# Patient Record
Sex: MALE | Race: AMERICAN INDIAN OR ALASKA NATIVE
[De-identification: names, ages, dates, MRNs, and addresses within clinical notes are randomized per-mention and may not be internally consistent; named-entity substitution may affect disease eponyms.]

---

## 2018-11-01 ENCOUNTER — HOSPITAL ENCOUNTER (OUTPATIENT)
Dept: HOSPITAL 42 - ED | Age: 83
Setting detail: OBSERVATION
LOS: 2 days | Discharge: HOME | End: 2018-11-03
Attending: INTERNAL MEDICINE | Admitting: INTERNAL MEDICINE
Payer: MEDICARE

## 2018-11-01 VITALS — BODY MASS INDEX: 25.8 KG/M2

## 2018-11-01 DIAGNOSIS — E78.00: ICD-10-CM

## 2018-11-01 DIAGNOSIS — N32.9: ICD-10-CM

## 2018-11-01 DIAGNOSIS — I08.3: ICD-10-CM

## 2018-11-01 DIAGNOSIS — R31.9: ICD-10-CM

## 2018-11-01 DIAGNOSIS — Z87.891: ICD-10-CM

## 2018-11-01 DIAGNOSIS — R07.9: Primary | ICD-10-CM

## 2018-11-01 DIAGNOSIS — R10.13: ICD-10-CM

## 2018-11-01 DIAGNOSIS — M19.90: ICD-10-CM

## 2018-11-01 LAB
ALBUMIN SERPL-MCNC: 3.8 G/DL (ref 3–4.8)
ALBUMIN/GLOB SERPL: 1.3 {RATIO} (ref 1.1–1.8)
ALT SERPL-CCNC: 21 U/L (ref 7–56)
APPEARANCE UR: CLEAR
AST SERPL-CCNC: 20 U/L (ref 17–59)
BASOPHILS # BLD AUTO: 0.02 K/MM3 (ref 0–2)
BASOPHILS NFR BLD: 0.2 % (ref 0–3)
BILIRUB UR-MCNC: NEGATIVE MG/DL
BNP SERPL-MCNC: 134 PG/ML (ref 0–450)
BUN SERPL-MCNC: 18 MG/DL (ref 7–21)
CALCIUM SERPL-MCNC: 9.3 MG/DL (ref 8.4–10.5)
COLOR UR: YELLOW
EOSINOPHIL # BLD: 0 10*3/UL (ref 0–0.7)
EOSINOPHIL NFR BLD: 0.4 % (ref 1.5–5)
ERYTHROCYTE [DISTWIDTH] IN BLOOD BY AUTOMATED COUNT: 15.8 % (ref 11.5–14.5)
GFR NON-AFRICAN AMERICAN: > 60
GLUCOSE UR STRIP-MCNC: NEGATIVE MG/DL
GRANULOCYTES # BLD: 5.93 10*3/UL (ref 1.4–6.5)
GRANULOCYTES NFR BLD: 60.2 % (ref 50–68)
HGB BLD-MCNC: 13.2 G/DL (ref 14–18)
LEUKOCYTE ESTERASE UR-ACNC: NEGATIVE LEU/UL
LYMPHOCYTES # BLD: 3.3 10*3/UL (ref 1.2–3.4)
LYMPHOCYTES NFR BLD AUTO: 33.5 % (ref 22–35)
MCH RBC QN AUTO: 28.1 PG (ref 25–35)
MCHC RBC AUTO-ENTMCNC: 32.9 G/DL (ref 31–37)
MCV RBC AUTO: 85.3 FL (ref 80–105)
MONOCYTES # BLD AUTO: 0.6 10*3/UL (ref 0.1–0.6)
MONOCYTES NFR BLD: 5.7 % (ref 1–6)
PH UR STRIP: 6.5 [PH] (ref 4.7–8)
PLATELET # BLD: 244 10^3/UL (ref 120–450)
PMV BLD AUTO: 11.2 FL (ref 7–11)
PROT UR STRIP-MCNC: 30 MG/DL
RBC # BLD AUTO: 4.7 10^6/UL (ref 3.5–6.1)
RBC # UR STRIP: (no result) /UL
RBC #/AREA URNS HPF: (no result) /HPF (ref 0–2)
SP GR UR STRIP: 1.01 (ref 1–1.03)
TROPONIN I SERPL-MCNC: < 0.01 NG/ML
TROPONIN I SERPL-MCNC: < 0.01 NG/ML
UROBILINOGEN UR STRIP-ACNC: 0.2 E.U./DL
WBC # BLD AUTO: 9.9 10^3/UL (ref 4.5–11)

## 2018-11-01 PROCEDURE — 71275 CT ANGIOGRAPHY CHEST: CPT

## 2018-11-01 PROCEDURE — 80053 COMPREHEN METABOLIC PANEL: CPT

## 2018-11-01 PROCEDURE — 96374 THER/PROPH/DIAG INJ IV PUSH: CPT

## 2018-11-01 PROCEDURE — 87086 URINE CULTURE/COLONY COUNT: CPT

## 2018-11-01 PROCEDURE — 96372 THER/PROPH/DIAG INJ SC/IM: CPT

## 2018-11-01 PROCEDURE — 80061 LIPID PANEL: CPT

## 2018-11-01 PROCEDURE — 36415 COLL VENOUS BLD VENIPUNCTURE: CPT

## 2018-11-01 PROCEDURE — 84484 ASSAY OF TROPONIN QUANT: CPT

## 2018-11-01 PROCEDURE — 84100 ASSAY OF PHOSPHORUS: CPT

## 2018-11-01 PROCEDURE — 85378 FIBRIN DEGRADE SEMIQUANT: CPT

## 2018-11-01 PROCEDURE — 83540 ASSAY OF IRON: CPT

## 2018-11-01 PROCEDURE — 83615 LACTATE (LD) (LDH) ENZYME: CPT

## 2018-11-01 PROCEDURE — 93970 EXTREMITY STUDY: CPT

## 2018-11-01 PROCEDURE — 85025 COMPLETE CBC W/AUTO DIFF WBC: CPT

## 2018-11-01 PROCEDURE — 83735 ASSAY OF MAGNESIUM: CPT

## 2018-11-01 PROCEDURE — 93005 ELECTROCARDIOGRAM TRACING: CPT

## 2018-11-01 PROCEDURE — 99285 EMERGENCY DEPT VISIT HI MDM: CPT

## 2018-11-01 PROCEDURE — 83036 HEMOGLOBIN GLYCOSYLATED A1C: CPT

## 2018-11-01 PROCEDURE — 82550 ASSAY OF CK (CPK): CPT

## 2018-11-01 PROCEDURE — 83690 ASSAY OF LIPASE: CPT

## 2018-11-01 PROCEDURE — 96375 TX/PRO/DX INJ NEW DRUG ADDON: CPT

## 2018-11-01 PROCEDURE — 71045 X-RAY EXAM CHEST 1 VIEW: CPT

## 2018-11-01 PROCEDURE — 93923 UPR/LXTR ART STDY 3+ LVLS: CPT

## 2018-11-01 PROCEDURE — 78452 HT MUSCLE IMAGE SPECT MULT: CPT

## 2018-11-01 PROCEDURE — 74177 CT ABD & PELVIS W/CONTRAST: CPT

## 2018-11-01 PROCEDURE — 84443 ASSAY THYROID STIM HORMONE: CPT

## 2018-11-01 PROCEDURE — 93017 CV STRESS TEST TRACING ONLY: CPT

## 2018-11-01 PROCEDURE — 93306 TTE W/DOPPLER COMPLETE: CPT

## 2018-11-01 PROCEDURE — 83880 ASSAY OF NATRIURETIC PEPTIDE: CPT

## 2018-11-01 PROCEDURE — 83550 IRON BINDING TEST: CPT

## 2018-11-01 PROCEDURE — 85610 PROTHROMBIN TIME: CPT

## 2018-11-01 PROCEDURE — 81001 URINALYSIS AUTO W/SCOPE: CPT

## 2018-11-01 RX ADMIN — ENOXAPARIN SODIUM SCH MG: 30 INJECTION SUBCUTANEOUS at 16:43

## 2018-11-01 RX ADMIN — LEVOFLOXACIN SCH MLS/HR: 5 INJECTION, SOLUTION INTRAVENOUS at 16:42

## 2018-11-01 NOTE — RAD
Date of service: 



11/01/2018



HISTORY:

 chest pain 



COMPARISON:

06/30/2014 



FINDINGS:



LUNGS:

No active pulmonary disease.



PLEURA:

No significant pleural effusion identified, no pneumothorax apparent.



CARDIOVASCULAR:

No aortic atherosclerotic calcification present.



Normal cardiac size. No pulmonary vascular congestion. 



OSSEOUS STRUCTURES:

No significant abnormalities.



VISUALIZED UPPER ABDOMEN:

Normal.



OTHER FINDINGS:

None.



IMPRESSION:

No active disease.

## 2018-11-01 NOTE — CT
Date of service: 



11/01/2018



PROCEDURE:  CT Abdomen and Pelvis with contrast



HISTORY:

abdominal pain



COMPARISON:

None.



TECHNIQUE:

Contrast dose: 100 cc of Omni 350



Radiation dose:



Total exam DLP = 464.72 mGy-cm.



This CT exam was performed using one or more of the following dose 

reduction techniques: Automated exposure control, adjustment of the 

mA and/or kV according to patient size, and/or use of iterative 

reconstruction technique.



FINDINGS:



LOWER THORAX:

Unremarkable. 



LIVER:

Unremarkable. No gross lesion or ductal dilatation. 



GALLBLADDER AND BILE DUCTS:

Unremarkable. 



PANCREAS:

Unremarkable. No gross lesion or ductal dilatation.



SPLEEN:

Unremarkable. 



ADRENALS:

Unremarkable. No mass. 



KIDNEYS AND URETERS:

Unremarkable. No hydronephrosis. No solid mass. 



VASCULATURE:

Unremarkable. No aortic aneurysm. Extensive aortic calcification



BOWEL:

Unremarkable. No obstruction. No gross mural thickening. 



APPENDIX:

Normal appendix. 



PERITONEUM:

Unremarkable. No free fluid. No free air. 



LYMPH NODES:

Unremarkable. No enlarged lymph nodes. 



BLADDER:

There is an enhancing mass on the left side of the bladder measuring 

19 x 23 mm.  This is suspicious for a bladder malignancy.  Further 

evaluation is recommended.  There is also a small stone in the 

bladder 



REPRODUCTIVE:

Prostate is mildly enlarged. 



BONES:

No acute fracture. 



OTHER FINDINGS:

None.



IMPRESSION:

There is an enhancing mass on the left side of the bladder measuring 

19 x 23 mm.  This is suspicious for a bladder malignancy.  Further 

evaluation is recommended



Small stone in the bladder 



No acute intra-abdominal findings.

## 2018-11-01 NOTE — ED PDOC
Arrival/HPI





- General


Chief Complaint: Chest Pain


Time Seen by Provider: 11/01/18 13:21


Historian: Patient





- History of Present Illness


Narrative History of Present Illness (Text): 


11/01/18 13:33


83 year old male who has no significant past medical history who presents to the

emergency department complaining of intermittent episodes of chest pain with 

most recent episode being yesterday. Per patient he was instructed to come to 

the emergency room for evaluation of his symptoms. Patient reports that the pain

is localized to the midsternal region, and admits to having episodes of chest 

pain last week, which resolve with time. He denies taking any Aspirin or other 

medications to alleviate his episodes, and admits that chest pain is associated 

with shortness of breath. Of note he denies any dyspnea on exertion, and denies 

exacerbation of his symptoms with certain meals or activities. Patient states 

that he currently isn't having chest pain, but rates the severity of the 

episodes as 10/10. He has also been experiencing occasional abdominal pain at 

night, and notes that he has tried to belch to alleviate his chest pain. Patient

also notes occasional left shoulder pain, and positional side pain. He is a 

former smoker, denies any history of hypertension or diabetes. Of note had a 

normal bowel movement this morning. Patient denies fevers, chills, cough, 

nausea, vomiting, diarrhea, back pain, neck pain, headache, or any other 

complaint. 





PMD:  








Time/Duration: 1 week


Symptom Onset: Sudden


Symptom Course: Intermittent


Severity Level: 10


Context: Home





Past Medical History





- Provider Review


Nursing Documentation Reviewed: Yes





- Travel History


Have you recently traveled outside [place] w/in the past 3 mons?: No





- Infectious Disease


Hx of Infectious Diseases: None





- Tetanus Immunization


Tetanus Immunization: Unknown





- Past Medical History


Past Medical History: No Previous





- Psychiatric


Hx Depression: No


Hx Emotional Abuse: No


Hx Physical Abuse: No


Hx Substance Use: No





- Surgical History


Hx Amputation: Yes (traumatic at work tip of 3rd)


Hx Cholecystectomy: Yes (galstones remove)





- Anesthesia


Hx Anesthesia: Yes


Hx Anesthesia Reactions: No


Hx Malignant Hyperthermia: No





- Suicidal Assessment


Feels Threatened In Home Enviroment: No





Family/Social History





- Physician Review


Nursing Documentation Reviewed: Yes


Family/Social History: No Known Family HX


Smoking Status: Never Smoked


Hx Alcohol Use: No


Hx Substance Use: No


Hx Substance Use Treatment: No





Allergies/Home Meds


Allergies/Adverse Reactions: 


Allergies





No Known Allergies Allergy (Verified 11/01/18 18:55)


   








Home Medications: 


                                    Home Meds











 Medication  Instructions  Recorded  Confirmed


 


RX: No Known Home Med  11/01/18 11/01/18














Review of Systems





- Physician Review


All systems were reviewed & negative as marked: Yes





- Review of Systems


Constitutional: absent: Night Sweats


Respiratory: SOB.  absent: Cough


Cardiovascular: Chest Pain.  absent: COOLEY, Syncope


Gastrointestinal: Abdominal Pain.  absent: Nausea, Vomiting


Genitourinary Male: absent: Dysuria


Musculoskeletal: absent: Back Pain, Neck Pain


Neurological: Dizziness (dizziness when lying down).  absent: Headache





Physical Exam


Vital Signs Reviewed: Yes





Vital Signs











  Temp Pulse Resp BP Pulse Ox


 


 11/01/18 13:19  98.2 F  71  18  141/62  96











Temperature: Afebrile


Blood Pressure: Normal


Pulse: Regular


Respiratory Rate: Normal


Appearance: Positive for: Well-Appearing


Mental Status: Positive for: Alert and Oriented X 3





- Systems Exam


Head: Present: Atraumatic, Normocephalic


Pupils: Present: PERRL


Extroacular Muscles: Present: EOMI


Conjunctiva: Present: Normal


Mouth: Present: Moist Mucous Membranes


Neck: Present: Normal Range of Motion


Respiratory/Chest: Present: Clear to Auscultation, Good Air Exchange.  No: 

Respiratory Distress, Accessory Muscle Use, Tender to Palpation


Cardiovascular: Present: Regular Rate and Rhythm, Normal S1, S2.  No: Murmurs


Abdomen: No: Tenderness, Distention, Peritoneal Signs


Back: Present: Normal Inspection, Other (Tenderness to palpation over left side 

trapezius muscle.)


Upper Extremity: Present: Other (distal fingertip amputation of third digit of 

left hand).  No: Cyanosis, Edema


Lower Extremity: Present: Normal Inspection.  No: Edema


Neurological: Present: GCS=15, CN II-XII Intact, Speech Normal


Skin: Present: Warm, Dry, Normal Color.  No: Rashes


Psychiatric: Present: Alert, Oriented x 3, Normal Insight, Normal Concentration





Medical Decision Making


ED Course and Treatment: 





11/01/18 13:33


Impression:


83 year old male complaining of intermittent chest pain that started last week 

with associated shortness of breath.





Differential Diagnosis included but are not limited to:  


ACS


Pneumonia


Pulmonary Embolism





Plan:


-- Abdominal and Pelvic CT with IV contrast


-- EKG


-- Labs


-- Cardiac enzymes


-- D-dimer


-- Chest X-ray


-- Pepcid


-- Toradol


-- Reassess and disposition





Prior Visits:


Notes and results from previous visits were reviewed. 





Progress Notes:


11/01/18 15:13


Discussed case with Dr. Pitt(PCP), who accepts patient onto his service.





11/01/18 15:15


Dr. Pitt called back stating that the patient has a primary physician that 

admits to the hospital. After review of chart patients PCP is . 

Will contact PCP.





11/01/18 15:19 


Discussed case with (PCP), who is aware of and accepts patient 

under his service, and requests  for cardiology consult. Requests CTA. 




















- Lab Interpretations


Lab Results: 











                                 11/01/18 13:29 





                                 11/01/18 13:29 





                                   Lab Results





11/01/18 13:29: Sodium 140, Potassium 4.4, Chloride 105, Carbon Dioxide 27, 

Anion Gap 12, BUN 18, Creatinine 0.9, Est GFR (African Amer) > 60, Est GFR (Non-

Af Amer) > 60, Random Glucose 97, Calcium 9.3, Magnesium 2.2, Total Bilirubin 

0.7, AST 20, ALT 21, Alkaline Phosphatase 86, Troponin I < 0.01, NT-Pro-B 

Natriuret Pep 134, Total Protein 6.7, Albumin 3.8, Globulin 2.9, 

Albumin/Globulin Ratio 1.3


11/01/18 13:29: D-Dimer, Quantitative 244 H


11/01/18 13:29: WBC 9.9, RBC 4.70, Hgb 13.2 L, Hct 40.1 L, MCV 85.3, MCH 28.1, 

MCHC 32.9, RDW 15.8 H, Plt Count 244, MPV 11.2 H, Gran % 60.2, Lymph % (Auto) 

33.5, Mono % (Auto) 5.7, Eos % (Auto) 0.4 L, Baso % (Auto) 0.2, Gran # 5.93, 

Lymph # (Auto) 3.3, Mono # (Auto) 0.6, Eos # (Auto) 0.0, Baso # (Auto) 0.02








I have reviewed the lab results: Yes





- RAD Interpretation


Narrative RAD Interpretations (Text): 





11/01/18 


Abdominal and Pelvic CT with IV contrast:


Dictator : Saroj Pfeiffer MD


FINDINGS:


LOWER THORAX: Unremarkable. 


LIVER: Unremarkable. No gross lesion or ductal dilatation. 


GALLBLADDER AND BILE DUCTS: Unremarkable. 


PANCREAS: Unremarkable. No gross lesion or ductal dilatation.


SPLEEN: Unremarkable. 


ADRENALS: Unremarkable. No mass. 


KIDNEYS AND URETERS: Unremarkable. No hydronephrosis. No solid mass. 


VASCULATURE: Unremarkable. No aortic aneurysm. Extensive aortic calcification


BOWEL: Unremarkable. No obstruction. No gross mural thickening. 


APPENDIX: Normal appendix. 


PERITONEUM: Unremarkable. No free fluid. No free air. 


LYMPH NODES: Unremarkable. No enlarged lymph nodes. 


BLADDER:


There is an enhancing mass on the left side of the bladder measuring 19 x 23 mm.

 This is suspicious for a bladder malignancy.  Further evaluation is 

recommended.  There is also a small stone in the bladder 


REPRODUCTIVE: Prostate is mildly enlarged. 


BONES: No acute fracture. 


OTHER FINDINGS: None.


IMPRESSION:


There is an enhancing mass on the left side of the bladder measuring 19 x 23 mm.

 This is suspicious for a bladder malignancy.  Further evaluation is 

recommended. Small stone in the bladder. No acute intra-abdominal findings.





11/01/18 


Chest X-ray:


Dictator : Saroj Pfeiffer MD


FINDINGS:


LUNGS: No active pulmonary disease.


PLEURA: No significant pleural effusion identified, no pneumothorax apparent.


CARDIOVASCULAR:No aortic atherosclerotic calcification present. Normal cardiac 

size. No pulmonary vascular congestion. 


OSSEOUS STRUCTURES: No significant abnormalities.


VISUALIZED UPPER ABDOMEN: Normal.


OTHER FINDINGS: None.


IMPRESSION:


No active disease.


Radiology Orders: 











11/01/18 13:28


CHEST PORTABLE [RAD] Stat 





11/01/18 13:42


ABDOMEN & PELVIS [ABD & PELVIS IV CONTRAST ONLY] [CT] Stat 











: Radiologist





- EKG Interpretation


EKG Interpretation (Text): 


11/01/18 13:18


EKG shows NSR at 71 BPM with intermittent PVCs. No ST elevation or QT 

prolongation. Interpreted by me.


Interpreted by ED Physician: Yes


Type: 12 lead EKG





- Scribe Statement


The provider has reviewed the documentation as recorded by the Scribe


Akil Dejesus


Provider Scribe Attestation:


All medical record entries made by the Scribe were at my direction and 

personally dictated by me. I have reviewed the chart and agree that the record 

accurately reflects my personal performance of the history, physical exam, 

medical decision making, and the department course for this patient. I have also

 personally directed, reviewed, and agree with the discharge instructions and 

disposition. 





Disposition/Present on Arrival





- Present on Arrival


Any Indicators Present on Arrival: No


History of DVT/PE: No


History of Uncontrolled Diabetes: No


Urinary Catheter: No


History of Decub. Ulcer: No


History Surgical Site Infection Following: None





- Disposition


Have Diagnosis and Disposition been Completed?: Yes


Diagnosis: 


 ACS (acute coronary syndrome), Bladder mass





Disposition: HOSPITALIZED


Disposition Time: 15:19


Patient Plan: Admission


Condition: STABLE

## 2018-11-01 NOTE — CARD
--------------- APPROVED REPORT --------------





Date of service: 11/01/2018



EKG Measurement

Heart [id number]

DC [id number]

[id number]

[id number]

[id number]



<Conclusion>

Sinus rhythm with premature atrial complexes

Otherwise normal ECG

## 2018-11-02 VITALS — RESPIRATION RATE: 20 BRPM

## 2018-11-02 VITALS — OXYGEN SATURATION: 95 %

## 2018-11-02 LAB
% IRON SATURATION: 36 % (ref 20–55)
ALBUMIN SERPL-MCNC: 3.5 G/DL (ref 3–4.8)
ALBUMIN/GLOB SERPL: 1.2 {RATIO} (ref 1.1–1.8)
ALT SERPL-CCNC: 20 U/L (ref 7–56)
AST SERPL-CCNC: 19 U/L (ref 17–59)
BASOPHILS # BLD AUTO: 0.01 K/MM3 (ref 0–2)
BASOPHILS NFR BLD: 0.1 % (ref 0–3)
BUN SERPL-MCNC: 20 MG/DL (ref 7–21)
CALCIUM SERPL-MCNC: 8.8 MG/DL (ref 8.4–10.5)
EOSINOPHIL # BLD: 0.1 10*3/UL (ref 0–0.7)
EOSINOPHIL NFR BLD: 0.7 % (ref 1.5–5)
ERYTHROCYTE [DISTWIDTH] IN BLOOD BY AUTOMATED COUNT: 16.2 % (ref 11.5–14.5)
GFR NON-AFRICAN AMERICAN: > 60
GRANULOCYTES # BLD: 3.51 10*3/UL (ref 1.4–6.5)
GRANULOCYTES NFR BLD: 51.7 % (ref 50–68)
HDLC SERPL-MCNC: 36 MG/DL (ref 29–60)
HGB BLD-MCNC: 12.9 G/DL (ref 14–18)
IRON SERPL-MCNC: 77 UG/DL (ref 45–180)
LDLC SERPL-MCNC: 144 MG/DL (ref 0–129)
LYMPHOCYTES # BLD: 2.9 10*3/UL (ref 1.2–3.4)
LYMPHOCYTES NFR BLD AUTO: 42.1 % (ref 22–35)
MCH RBC QN AUTO: 27.7 PG (ref 25–35)
MCHC RBC AUTO-ENTMCNC: 32.3 G/DL (ref 31–37)
MCV RBC AUTO: 85.6 FL (ref 80–105)
MONOCYTES # BLD AUTO: 0.4 10*3/UL (ref 0.1–0.6)
MONOCYTES NFR BLD: 5.4 % (ref 1–6)
PLATELET # BLD: 231 10^3/UL (ref 120–450)
PMV BLD AUTO: 11.8 FL (ref 7–11)
RBC # BLD AUTO: 4.66 10^6/UL (ref 3.5–6.1)
TIBC SERPL-MCNC: 216 UG/DL (ref 261–462)
TROPONIN I SERPL-MCNC: < 0.01 NG/ML
WBC # BLD AUTO: 6.8 10^3/UL (ref 4.5–11)

## 2018-11-02 RX ADMIN — LEVOFLOXACIN SCH MLS/HR: 5 INJECTION, SOLUTION INTRAVENOUS at 12:10

## 2018-11-02 RX ADMIN — ENOXAPARIN SODIUM SCH MG: 30 INJECTION SUBCUTANEOUS at 12:10

## 2018-11-02 NOTE — CARD
--------------- APPROVED REPORT --------------





Date of service: 11/02/2018



EXAM: Two-dimensional and M-mode echocardiogram with Doppler and 

color Doppler.



INDICATION

Chest Pain 



2D DIMENSIONS 

Left Atrium (2D)3.3   (1.6-4.0cm)IVSd1.0   (0.7-1.1cm)

LVDd5.3   (3.9-5.9cm)PWd1.1   (0.7-1.1cm)

LVDs3.9   (2.5-4.0cm)FS (%) 26.7   %

LVEF (%)51.6   (>50%)



M-Mode DIMENSIONS 

Aortic Root3.40   (2.2-3.7cm)Aortic Cusp Exc.1.90   (1.5-2.0cm)



Aortic Valve

AoV Peak Pmkvbaol503.0cm/[place] Peak GR.5mmHg



Mitral Valve

MV E Yflugqeu03.4cm/sMV A Zwdwzsxl32.1cm/sE/A ratio0.8



TDI

E/Lateral E'0.0E/Medial E'0.0



Tricuspid Valve

TR Peak Pjknwklo687ws/sRAP BZTVLMAR05qvHjLY Peak Gr.33mmHg

[id number]



 LEFT VENTRICLE 

The left ventricle is normal size. There is borderline to mild 

concentric left ventricular hypertrophy. Low normal , EF-50% There is 

mild hypokinesis in the apical anterior wall. Transmitral Doppler 

flow pattern is Grade III-reversible restrictive diastolic 

dysfunction. No left ventricle thrombus noted on this study. There is 

no ventricular septal defect visualized. There is no left ventricular 

aneurysm. There is no mass noted in the left ventricle.



 RIGHT VENTRICLE 

The right ventricle is normal size. There is normal right ventricular 

wall thickness. The right ventricular systolic function is normal.



 ATRIA 

The left atrium size is normal. The right atrium size is normal. The 

interatrial septum is intact with no evidence for an atrial septal 

defect.



 AORTIC VALVE 

The aortic valve is calcified but opens well. There is mild aortic 

regurgitation. There is no aortic valvular stenosis. There is no 

aortic valvular vegetation.



 MITRAL VALVE 

The mitral valve is thickened but opens well. Mitral regurgitation is 

mild. There is no mitral valve stenosis. There is no evidence of 

mitral valve prolapse.



 TRICUSPID VALVE 

The tricuspid valve leaflets are thickened , but open well. There is 

mild to moderate tricuspid regurgitation.RVSP-43 mmof Hg. There is no 

tricuspid valve stenosis. There is no tricuspid valve prolapse or 

vegetation.



 PULMONIC VALVE 

The pulmonic valve is mildly thickened. There is moderate pulmonic 

valvular regurgitation., multiple jets. There is no pulmonic valvular 

stenosis.



 GREAT VESSELS 

The aortic root is normal in size. The ascending aorta is normal in 

size. The pulmonary artery is normal. The IVC is normal in size and 

collapses >50% with inspiration.



 PERICARDIAL EFFUSION 

There is no pleural effusion. There is no pericardial effusion.



<Conclusion>

The left ventricle is normal size.

There is borderline to mild concentric left ventricular hypertrophy.

Low normal , EF-50%

There is  mild aortic regurgitation.

Mitral regurgitation is mild.

There is mild to moderate tricuspid regurgitation.RVSP-43 mmof Hg.

There is moderate pulmonic valvular regurgitation., multiple jets.

The IVC is normal in size and collapses >50% with inspiration.

There is no pericardial effusion.

## 2018-11-02 NOTE — CP.PCM.PN
Subjective





- Date & Time of Evaluation


Date of Evaluation: 11/02/18


Time of Evaluation: 06:35





- Subjective


Subjective: 





Awake, alert ,no distress





Reason for consultation and follow up: Cardiac evaluation of chest pain





Seen and examined by me and Dr. Burrows





Objective





- Vital Signs/Intake and Output


Vital Signs (last 24 hours): 


                                        











Temp Pulse Resp BP Pulse Ox


 


 98.1 F   51 L  20   104/62   96 


 


 11/02/18 00:01  11/02/18 00:01  11/02/18 00:01  11/02/18 00:01  11/02/18 00:01











- Medications


Medications: 


                               Current Medications





Enoxaparin Sodium (Lovenox)  30 mg SC DAILY [facility]; Protocol


   Last Admin: 11/01/18 16:43 Dose:  30 mg


Famotidine (Pepcid)  20 mg IVP DAILY [facility]


Levofloxacin/Dextrose (Levaquin 500mg)  500 mg in 100 mls @ 100 mls/hr IVPB GIOVANA

LY [facility]; Protocol


   Last Admin: 11/01/18 16:42 Dose:  100 mls/hr


Oxycodone/Acetaminophen (Percocet 5/325 Mg Tab)  1 tab PO Q4H PRN


   PRN Reason: Pain, severe (8-10)











- Labs


Labs: 


                                        





                                 11/02/18 06:00 





                                 11/02/18 06:00 











- Constitutional


Appears: Non-toxic, No Acute Distress





- Head Exam


Head Exam: NORMAL INSPECTION, NORMOCEPHALIC





- Eye Exam


Eye Exam: Normal appearance


Pupil Exam: NORMAL ACCOMODATION





- ENT Exam


ENT Exam: Mucous Membranes Moist, Normal Exam





- Respiratory Exam


Respiratory Exam: Clear to Ausculation Bilateral, NORMAL BREATHING PATTERN





- Cardiovascular Exam


Cardiovascular Exam: REGULAR RHYTHM, +S1, +S2


Additional comments: 





No JVD





- GI/Abdominal Exam


GI & Abdominal Exam: Soft, Normal Bowel Sounds





- Extremities Exam


Extremities Exam: Full ROM, Normal Capillary Refill





- Neurological Exam


Neurological Exam: Alert, Awake, Oriented x3





- Psychiatric Exam


Psychiatric exam: Normal Affect, Normal Mood





- Skin


Skin Exam: Dry, Normal Color, Warm





Assessment and Plan





- Assessment and Plan (Free Text)


Assessment: 





An 83 year old male who came in to the ER due to intermittent chest pains. No 

significant medical history. Former smoker. Troponin normal x 3. Patient also 

complaining of abdominal pain. CT of abdomen showed small stone noted on bladd

er. There is an enhancing mass of the left side of the bladder measuring  19x 23

mm mass.Suspicious of malignancy. Denies chest pain now. For stress test and 

ECHO today.








Plan: 





For ECHO today


For Stress test today


Kept NPO


Denies chest pain, no distress


Heart rate controlled


Blood pressure controlled


Continue current treatment


Continue current medications


Chart reviewed


Will follow up





Plan and treatment discussed with Dr. Burrows

## 2018-11-02 NOTE — CARD
--------------- APPROVED REPORT --------------





Date of service: 11/02/2018



Protocol: LEXISCAN

Test Type: Lexiscan Sestamibi Stress Test

Attending Physician: Dr. Chip Bergeron

Referring Physician: Dr. Lora Smith  

Test Indications: Chest Pain

Height:5 ft  7  in

Weight:165lbs 

Medications: LOVENOX, PEPCID LEVAQUIN, PERCOCET

Medical History: 83 YEAR OLD MALE WITH A H/O BLADDER MASS, PROSTATE 

PROBLEMS, TONSILECTOMY AND CHOLECYSTECTOMY

Target HR: 139 bpm

Resting ECG: Sinus Bradycardia 43/min.

Resting Heart Rate: 48 bpm

Resting Blood Pressure: 122/72mmHg

Submaximum (85%): 118 bpm



PROCEDURE

Pharmacologic stress testing was performed using 0.4mg per 5ml of 

regadenoson given intravenously over 7-10 seconds.



POST EXERCISE

Reason for Termination: Protocol completed

Target HR: No

Max HR: 47 bpm

55% of Maximum Predicted HR: 139 bpm

Exercise duration: 00:31 min:sec, 0 Stage

Exercise capacity: 1.0METs

Max Blood Pressure: 122/72mmHg

Blood Pressure response to exercise: normal resting BP - appropriate 

response

Heart Rate response to exercise: appropriate

Chest Pain: No, none

Angina index: 0

Arrhythmia: No, none

ST Change: No, none

Deviation: 0 mm



INTERPRETATION

Stress EKG Conclusion: IV LEXISCAN NUCLEAR STRESS TEST NEGATIVE FOR 

CHEST PAIN AND NEGATIVE FOR ST-T CHANGES.







NUCLEAR SCAN REPORT PENDING.



Signed by  Chip Bergeron

Electronically Approved: 11/02/2018 11:56:44



EXAM: Myocardial Perfusion REST/STRESS

Stress Test Type: Pharmacologic



Imaging Protocol

The imaging protocol used to acquire images was Rest Tc-99m/stress 

Tc-99m 1 day

Rest Spect myocardial perfusion imaging was performed in supine 

position 36 minutes following the injection of 10.5 mCi of Tc-99 

Myoview.

At peak stress, the patient was injected intravenously with 30.5mCi 

of Tc-99 tetrofosmin after an infusion time of  minutes and  seconds.

Gated Stress Spect was performed 60 minutes after intravenous Tc-99 

Myoview injection.

The images were gated to evaluate regional wall motion and calculate 

ventricular ejection fraction.Images were reconstructed using 

backfilter projection method in short horizontal and verticle long 

axis. Spect slices were generated.



LV Perfusion

The quality of the study is good. The left ventricle is within normal 

limits in size. The right ventricle is unremarkable. The lung uptake 

is normal. The distribution of tracer reveals moderately and 

diffusely  decreased perfusion in the apical and  inferior walls on 

the stress study. The remainder of the LV myocardium is unremarkable. 

 The rest myocardial perfusion study shows no significant change.



Wall Motion

Wall motion study shows good contractility of the left ventricle.  

LVEF = 51%.



Conclusion

1. Probablynormal SPECT myocardial perfusion study.

2. Fixed, apical and  inferior defects are most likely due to 

diaphragmatic attenuation.  However, previous myocardial injury in 

this region cannot be ruled out.

3. Normal gated wall motion of the left ventricle.

## 2018-11-02 NOTE — US
HISTORY:

Leg pain and swelling. Evaluate for DVT



PHYSICIAN(S):  Jaden Cruz MD.



TECHNIQUE:

Duplex sonography and color-flow Doppler with graded compression were 

used to evaluate the deep venous systems of both lower extremities. 



FINDINGS:

The visualized deep venous systems of both lower extremities are 

sonographically normal and compressible. Normal wave forms and 

augmentation are seen. There is no sonographic evidence for deep 

venous thrombosis in the visualized segments of both lower 

extremities.



IMPRESSION:

No sonographic evidence for deep venous thrombosis in the visualized 

segments of both lower extremities.

## 2018-11-02 NOTE — HP
DATE OF EXAM: 11/1/18



HISTORY OF PRESENT ILLNESS:  The patient is an 83-year-old male, he was

examined in the emergency room.  The patient was evaluated in the emergency

room for chest pain and epigastric pain.  The patient has no history of

nausea or vomiting.  Evaluation in the emergency room, the patient has

acute pain associated with mass in the bladder that is extruding from the

bladder.  The patient has blood in the urine with white cells.



PAST MEDICAL HISTORY:  The patient has a history of degenerative arthritis

and lumbar degenerative disease.  The patient has had previous history of

gastritis.



MEDICATIONS:  The patient is not taking any medications at this time.



ALLERGIES:  HE HAS NO KNOWN ALLERGIES.



SOCIAL HISTORY:  He does not smoke and does not drink.  There is no alcohol

intake.



PHYSICAL EXAMINATION:

GENERAL:  He is lying in bed.  He has some minimal discomfort at this time

in the epigastric area and chest.

VITAL SIGNS:  Pulse is 52, it was 71 and ranges between 50 and 70.  Blood

pressure is 120/70, the patient's respirations are 18 per minute and O2 sat

is 96%.

HEENT:  Head is normocephalic.

NECK:  Thyroid is not enlarged.  JVP is flat.  Carotid pulses are present.

LUNGS:  Trachea is central.  Breath sounds are vesicular.  No adventitious

sounds are heard.

HEART:  Normal sinus rhythm.  S1 and S2 present.  No murmurs.

ABDOMEN:  Soft.  There is some minimal tenderness in the epigastric and

central abdominal area.  No masses are palpable.

RECTAL:  Deferred for now.



LABORATORY DATA:  Examination of the urine shows evidence of red blood

cells and white blood cells.  The patient's chemistry is within normal

range.  The patient's white count is within normal range.



In summary, the patient also complains of intermittent claudication in his

leg and some twitching of his left fingers, the nature of this has to be

diagnosed.



IMPRESSION AND PLAN:  At this time, the patient has acute abdominal and

epigastric chest pain.  Cardiac evaluation done in the emergency room does

not reveal any acute myocardial infarction.  The patient's evaluation of

the d-dimer shows there is abnormality.  We are doing a CT angiogram to

confidently diagnosed any pulmonary emboli.  Since the patient had a CT scan

of the abdomen with contrast today, the CT angiogram cannot be done until 
tomorrow.

We will place the patient on anticoagulation such as Lovenox at this time and he
will be 

placed on pain medication.  Cardiac evaluation will be done with Dr. Burrows and 
urological

evaluation with Dr. Bhakta, who is an urologist.







As far as the patient's pain management, we will place the patient on

Percocet 5/325 every 4 hours p.r.n.  The patient also will be placed on

antibiotic at this time for questionable urinary tract infection associated

with mass in the bladder.







__________________________________________

Lora Smith MD





DD:  11/01/2018 15:46:54

DT:  11/01/2018 19:06:44

Job # [id number]

[email or web address]

## 2018-11-02 NOTE — PN
DATE:  11/02/2018



REASON FOR CONSULTATION AND FOLLOWUP:  Chest pain, cardiac evaluation.



Patient is scheduled for stress test and echo today.  Troponin remains

flat.  No evidence of acute MI.



This note is in addition to dictated by nurse practitioner.



We will follow the stress test and echo.  Further recommendation made after

the stress test and echo.  TSH and lipid profile have evaluated.  Patient

had hypercholesterolemia with .  We will start 40 mg of Lipitor.



Thank you Dr. Smith for providing the opportunity in taking care of

patient, Paul Reyes.





__________________________________________

Chip Burrows MD





DD:  11/02/2018 8:38:59

DT:  11/02/2018 9:52:33

Job # [id number]

## 2018-11-02 NOTE — US
PROCEDURE:  Lower extremity VIDYA exam



HISTORY:

Peripheral vascular disease with pain and claudication.  Previous 

smoker 



PHYSICIAN(S):  Jaden Cruz MD.



FINDINGS:

The resting VIDYA's are normal: right, 1.07and left, 1.10.



The brachial systolic pressures are symmetric.



The high thigh pressures and waveforms are relatively normal.



The calf PVR waveforms augment normally. No significant gradients are 

noted across the thighs.



The ankle and metatarsal waveforms are relatively normal and 

symmetric. No significant pressure gradients are noted across the 

lower legs.



IMPRESSION:

1. Relatively normal VIDYA and PVR examination at rest.

## 2018-11-02 NOTE — CT
Date of service: 



11/02/2018



PROCEDURE:  CT Chest with contrast (Pulmonary Angiogram)



HISTORY:

SOB w/ chest pain elevated D-dimer



COMPARISON:

None available.



TECHNIQUE:

Axial computed tomography images were obtained of the chest in the 

pulmonary arterial phase of enhancement. Coronal and sagittal 

reformatted images were created and reviewed.



Intravenous contrast dose: 



Radiation dose:



Total exam DLP = 443.9 mGy-cm.



This CT exam was performed using one or more of the following dose 

reduction techniques: Automated exposure control, adjustment of the 

mA and/or kV according to patient size, and/or use of iterative 

reconstruction technique.



FINDINGS:



PULMONARY ARTERIES:

Unremarkable. No pulmonary embolism. 



AORTA:

No acute findings. No thoracic aortic aneurysm. No aortic 

atherosclerotic calcification or mural plaque present.



LUNGS:

Minimal bibasilar discoid atelectasis. 



PLEURAL SPACES:

Unremarkable. No effusion or pneumothorax. 



HEART:

Unremarkable. No cardiomegaly. No significant pericardial effusion. 



LYMPH NODES:

No lymphadenopathy.



BONES, CHEST WALL:

Unremarkable. No fracture or destructive lesion 



OTHER FINDINGS:

Unremarkable. 



IMPRESSION:

Minimal bibasilar discoid atelectasis..  No pulmonary embolus.

## 2018-11-02 NOTE — PN
DATE:  11/02/2018



SUBJECTIVE:  The patient is an 83-year-old male, who is admitted to [facility] for complaint of chest pain as well as epigastric pain.  He

was admitted to rule out myocardial infarction as well as pulmonary

embolus.  He had an elevated D-dimer.  On a CT scan, the patient was found

to have a 2 cm bladder mass, felt to be suspicious for malignancy and a 

consultation was requested.  The patient's urinalysis does show large blood

with 25-30 rbc, 5-10 wbc, but negative for nitrites.  CT scan of the

abdomen and pelvis showed an enhancing mass on the left side of the bladder

measuring 19 x 23 mm.  Prostate was mildly enlarged.  Kidneys were

unremarkable.  No hydronephrosis or solid mass.  GFR is greater than 60

with a creatinine of 1.  WBC count normal at 6.8, hemoglobin 12.9.



IMPRESSION AND PLAN:  This is an 83-year-old male, being evaluated for

chest pain, possible pulmonary embolism.  Urologically, the patient has

hematuria as well as finding of a bladder mass on CT scan.  I will schedule

him for a cystoscopy with possible transurethral resection of bladder

tumor.  I already spoke with the operating room.  Unfortunately, as this is

a nonemergent procedure at this time, they had no available elective

operating time obviously on this weekend and the patient will be scheduled

for Tuesday, 11/06/2018 in the morning if he is medically cleared by Dr. Smith and Cardiology at that time.  Plan to keep the patient n.p.o.

after midnight on Monday, 11/05/2018 and we will need to hold any

anticoagulation prior to the planned procedure.  I will discuss this

further with his medical attending, Dr. Smith.





__________________________________________

Beka Bhakta MD





DD:  11/02/2018 16:15:14

DT:  11/02/2018 16:17:59

Job # [id number]

## 2018-11-02 NOTE — CON
DATE:  11/01/2018

REASON FOR CONSULTATION:  Chest pain, cardiac evaluation.



BRIEF CLINICAL HISTORY:  This is an 83-year-old male with no significant

past medical history who has 2 episodes of chest pain, one yesterday and

the sharp pain while the patient watching television and this is the same

kind of pain 1 week ago, but he did not notice.  Denies any prior episode

of chest pain, dyspnea on exertion or any typical angina symptoms.  Denies

any palpitations, denies any shortness of breath.



PAST HISTORY:  Nothing significant.



PAST SURGICAL HISTORY:  Significant for cholecystectomy more than 15 years

ago.



SOCIAL HISTORY:  Denies any smoking, quit 10 years ago.  Denies any history

of alcohol abuse.



CURRENT MEDICATIONS:  None.



REVIEW OF SYSTEMS:  A 14-point review of systems as per HPI.



PHYSICAL EXAMINATION:

VITAL SIGNS:  Temperature afebrile, heart rate is 71, blood pressure

140/62.

HEENT:  PERRLA.  Extraocular muscles Intact.

NECK:  Supple.  No carotid bruit or thyromegaly.

CHEST:  Clear to auscultation.

HEART:  S1, S2 regular.

ABDOMEN:  Soft.

EXTREMITIES:  Clubbing and cyanosis negative.



DIAGNOSTICS AND LABORATORY DATA:  EKG shows normal sinus at rate of 71, APC

is noted, otherwise within normal limits.  WBC 9.9, hemoglobin 13.8,

hematocrit 40.1, platelet count 244.  Chemistry shows sodium 140, potassium

4.4, chloride of 105, carbon dioxide 27, anion gap of 12, BUN 18,

creatinine 0.9.  Troponin is 0.01.  Urine shows large quantity of blood

noted in the urine and protein positive in the urine.  Chest x-ray within

normal limits.  CAT scan of the abdomen and pelvis was done because the

patient complaining of abdominal pain as well and that shows no acute

intraabdominal finding.  A small stone noted in the bladder.  There is

enhancing mass of the left side of the bladder measuring 19 x 23 mm,

suspicious mass could be malignancy.



IMPRESSION:  An 83-year-old male with no significant past medical history

admitted with sharp chest pain.  A CAT scan of the abdomen because of

abdominal pain done that shows a bladder mass of 19 x 23 mm suspicious of

malignancy, admitted for cardiac evaluation.  Given multiple risks of

coronary artery disease, age and previous smoker, _____ check on a stress

test, first troponin is negative, rule out serial CPK, troponin, lipid

profile, TSH, hemoglobin A1c.  Urine cultures may be urinary tract

infection.  Continue broad-spectrum antibiotics, gentle hydration and we

will follow with you.



Thank you  _____ providing us the opportunity in taking care of the

patient, Paul Reyes.







__________________________________________

Chip Burrows MD





DD:  11/01/2018 18:23:28

DT:  11/02/2018 1:43:00

Job # [id number]

## 2018-11-03 VITALS — SYSTOLIC BLOOD PRESSURE: 132 MMHG | TEMPERATURE: 98.1 F | DIASTOLIC BLOOD PRESSURE: 77 MMHG

## 2018-11-03 VITALS — HEART RATE: 60 BPM

## 2018-11-03 LAB
INR PPP: 1
PROTHROMBIN TIME: 11.5 SECONDS (ref 9.4–12.5)

## 2018-11-03 RX ADMIN — ENOXAPARIN SODIUM SCH: 30 INJECTION SUBCUTANEOUS at 10:02

## 2018-11-03 RX ADMIN — LEVOFLOXACIN SCH: 5 INJECTION, SOLUTION INTRAVENOUS at 10:02

## 2018-11-03 NOTE — CP.PCM.PN
Subjective





- Date & Time of Evaluation


Date of Evaluation: 11/03/18


Time of Evaluation: 07:25





- Subjective


Subjective: 








Awake, alert ,no distress,denies chest pain





Reason for consultation and follow up: Cardiac evaluation of chest pain





Seen and examined by me and Dr. Burrows





Objective





- Vital Signs/Intake and Output


Vital Signs (last 24 hours): 


                                        











Temp Pulse Resp BP Pulse Ox


 


 97.4 F L  46 L  20   117/64   95 


 


 11/02/18 16:17  11/03/18 06:00  11/02/18 16:17  11/02/18 16:17  11/02/18 16:17








Intake and Output: 


                                        











 11/03/18 11/03/18





 06:59 18:59


 


Intake Total 180 


 


Balance 180 














- Medications


Medications: 


                               Current Medications





Atorvastatin Calcium (Lipitor)  40 mg PO DIN HANG


   Last Admin: 11/02/18 17:34 Dose:  40 mg


Enoxaparin Sodium (Lovenox)  30 mg SC DAILY HANG; Protocol


   Last Admin: 11/02/18 12:10 Dose:  30 mg


Famotidine (Pepcid)  20 mg IVP DAILY HANG


   Last Admin: 11/02/18 12:10 Dose:  20 mg


Levofloxacin/Dextrose (Levaquin 500mg)  500 mg in 100 mls @ 100 mls/hr IVPB 

DAILY HANG; Protocol


   Last Admin: 11/02/18 12:10 Dose:  100 mls/hr


Oxycodone/Acetaminophen (Percocet 5/325 Mg Tab)  1 tab PO Q4H PRN


   PRN Reason: Pain, severe (8-10)











- Labs


Labs: 


                                        





                                 11/02/18 06:00 





                                 11/02/18 06:00 











- Constitutional


Appears: Non-toxic, No Acute Distress





- Head Exam


Head Exam: NORMAL INSPECTION, NORMOCEPHALIC





- Eye Exam


Eye Exam: Normal appearance


Pupil Exam: NORMAL ACCOMODATION





- ENT Exam


ENT Exam: Mucous Membranes Moist, Normal Exam





- Cardiovascular Exam


Cardiovascular Exam: +S1, +S2


Additional comments: 





No JVD





- GI/Abdominal Exam


GI & Abdominal Exam: Soft, Normal Bowel Sounds





- Extremities Exam


Extremities Exam: Full ROM, Normal Capillary Refill





- Neurological Exam


Neurological Exam: Alert, Awake, Oriented x3





- Psychiatric Exam


Psychiatric exam: Normal Affect, Normal Mood





- Skin


Skin Exam: Dry, Normal Color, Warm





Assessment and Plan





- Assessment and Plan (Free Text)


Assessment: 








An 83 year old male who came in to the ER due to intermittent chest pains. No 

significant medical history. Former smoker. Troponin normal x 3. Patient also 

complaining of abdominal pain. CT of abdomen showed small stone noted on 

bladder. There is an enhancing mass of the left side of the bladder measuring  

19x 23 mm mass.Suspicious of malignancy. Denies chest pain now. 

Hypercholesterolemia, started on Lipitor. Normal VIDYA/PVR. ECHO done- LV size 

normal,LVEF 50%, Mild aortic regurgitation,  Mild mitral regurgitation, Mild to 

moderate tricuspid regurgitation RVSP 43mmHg, Moderate pulmonic valvular 

regurgitation, no pericardial effusion. Stress test done- Normal stress test 

LVEF 51%. Troponin negative x 3. Rule out acute myocardial infarction. No 

evidence of ischemia.


For possible cystoscopy on Tuesday. Patient is cleared for procedure with 

moderate risk. No absolute contraindication. No evidence of ischemia or heart 

failure.








Plan: 





 ECHO done- LV size normal,LVEF 50%, Mild aortic regurgitation


                  Mild mitral regurgitation


                  Mild to moderate tricuspid regurgitation RVSP 43mmHg


                  Moderate pulmonic valvular regurgitation, no pericardial 

effusion


Stress test done- Normal stress test LVEF 51%


Normal VIDYA/PVR


CT of chest negative for pulmonary embolism


For possible cystoscopy on Tuesday


Cleared for procedure from cardiac standpoint


Moderate risk for procedure


Denies chest pain, no distress


Heart rate controlled


Blood pressure controlled


On Lovenox 30 mg daily,Lipitor 40 mg daily


Continue current treatment


Continue current medications


Chart reviewed


Will follow up





Plan and treatment discussed with Dr. Burrows

## 2018-11-03 NOTE — PN
DATE:  11/03/2018



LOCATION:  The patient is in room 376, bed 2.



SUBJECTIVE:  He was admitted with chest pain, and epigastric pain, and

abdominal pain.  Patient has no complaints of pain this morning.



Vital Signs: Temperature 98.1, Pulse 47 -60, /77, respirations 20;

oxygen saturation 95% on room air

CVS: +S1,S2 present

Lungs: clear to auscultation bilaterally

Abdomen: soft, nontender, nondistended

Extremities: no edema bilaterally



Plan:  The patient had a CAT scan of the abdomen, which shows

evidence of bladder mass.  The patient has hematuria.  The EKG showed

sinus rhythm with PACs.  The patient had a cardiac stress

test, chemical stress test yesterday and it shows evidence of possible

previous myocardial damage, no acute injury noted at this time.  The

patient was seen by Dr. Burrows, the Cardiologist and echocardiogram shows an

ejection fraction of more than 50%.  The patient's valvular apparatus

seemed to be okay.  The patient's chest x-ray is clear. The patient's chemistry,


the BUN and creatinine within normal range, hemoglobin is 12.9, platelet count 
is

231,000.  The patient will have PT/INR done today.  He is scheduled for a

cystoscopy on Tuesday to evaluate the lesion on the bladder.  The patient

possibly is going to be evaluated by the cardiologist and be discharged

today and instructions given to the patient, so that he can be in the same

day surgery facility for followup cystoscopy.  The patient's clinical

condition today is stable.  The patient has no acute problems, and the

patient is aware of his condition, and his wife will be notified also

regarding the arrangements.





__________________________________________

Lora Smith MD





DD:  11/03/2018 8:16:45

DT:  11/03/2018 9:09:02

Job # [id number]

[email or web address]

## 2018-11-05 NOTE — PN
DATE:  11/03/2018



REASON FOR CONSULTATION AND FOLLOWUP:  Chest pain, cardiac evaluation.



This note is an addition to a dictation by the nurse practitioner.



The patient underwent echo stress test.  This stress test shows no

reversible ischemia.  Atypical chest pain.  Troponin remains negative,

flat.  No evidence of acute MI.  The stress test reported probable normal

myocardial perfusion _____ apical inferior defect due to diaphragmatic

attenuation.  No evidence of ischemia.  Ejection fraction 51%.  The patient

had also an echocardiography done yesterday that showed ejection fraction

of low 50% normal, mild-to-moderate tricuspid regurgitation _____ 43,

moderate pulmonary insufficiency, multiple jets.  The patient is scheduled

for OR by Dr. Bhakta.  Discussed with Dr. Smith.  Discussed with the

patient's family.  The patient is cleared to go for a  procedure and

needs a cystoscopy.  The patient is cleared from cardiology point of view

to go for cystoscopy with moderately underlying comorbid, but no absolute

contraindication.  No evidence of ischemia, no evidence of arrhythmia, no

evidence of congestive heart failure.  We will clear to go to

mild-to-moderate risk though the procedure is a mild risk procedure.



We will follow with you.



Thank you Dr. Smith for providing the opportunity in taking care of

the patient, Paul Reyes.





__________________________________________

Chip Burrows MD





DD:  11/03/2018 10:43:18

DT:  11/03/2018 13:06:31

Job # [id number]

## 2018-12-17 ENCOUNTER — HOSPITAL ENCOUNTER (OUTPATIENT)
Dept: HOSPITAL 42 - SDS | Age: 83
Discharge: HOME | End: 2018-12-17
Attending: UROLOGY
Payer: MEDICARE

## 2018-12-17 VITALS — RESPIRATION RATE: 20 BRPM | TEMPERATURE: 97.5 F

## 2018-12-17 VITALS — SYSTOLIC BLOOD PRESSURE: 114 MMHG | DIASTOLIC BLOOD PRESSURE: 52 MMHG | HEART RATE: 49 BPM

## 2018-12-17 VITALS — BODY MASS INDEX: 25.8 KG/M2

## 2018-12-17 VITALS — OXYGEN SATURATION: 98 %

## 2018-12-17 DIAGNOSIS — F17.200: ICD-10-CM

## 2018-12-17 DIAGNOSIS — C67.9: Primary | ICD-10-CM

## 2018-12-17 PROCEDURE — 88307 TISSUE EXAM BY PATHOLOGIST: CPT

## 2018-12-17 PROCEDURE — 52240 CYSTOSCOPY AND TREATMENT: CPT

## 2018-12-17 NOTE — OP
PROCEDURE DATE:  12/17/2018



PREOPERATIVE DIAGNOSIS:  Bladder mass.



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



PROCEDURE:  Cystoscopy, transurethral resection of large bladder tumor,

dilation of urethral meatus with sounds.



ATTENDING SURGEON:  Beka Bhakta MD.



ANESTHESIA:  General.



SPECIMENS:  Bladder tumor chips were sent to Pathology.



DRAINS:  A 20-Irish 3-way Mills catheter.



COMPLICATIONS:  There were none.



DESCRIPTION OF PROCEDURE:  After informed consent was obtained, the patient

was taken to the operating room, placed on the operating table and general

anesthesia was administered.  The patient was placed in dorsal lithotomy

position and prepped and draped in usual sterile fashion.  First, a

21-Irish cystoscope was placed in the patient's urethra.  The meatus was

somewhat tight and male urethral sounds were then used to dilate the meatus

to 28-Irish size.  At this point, the cystoscope was then placed into the

meatus and advanced proximally under direct vision until the bladder was

entered.  A full survey inspection of bladder was then performed, which

revealed a large papillary and solid-appearing mass located on the left

sidewall extending up to the dome and extending from the bladder neck area

distally back on to the posterior wall proximally.  The tumor was

approximately 7 x 5 cm in area.  Both ureteral orifices were visualized and

appeared within normal limits.  There were no other tumors noted.  There

were areas of hemorrhage and necrosis noted.  At this point, the cystoscope

was withdrawn and a 26-Irish resectoscope with a visualizing obturator was

passed into the bladder.  The obturator was removed and a working element

was then passed.  Using a resecting loop, the tumor was then resected in

its entirety down into the muscular wall.  Any bleeding points encountered

were then controlled using electrocautery.  After the tumor had been

completely resected, a Urovac device was then used to evacuate the tumor

chips, which were then sent to Pathology as specimen.  A rollerball

electrode was then used to completely fulgurate the base of the tumor along

with an area of surrounding normal mucosa.  Final inspection revealed no

remaining chips in the bladder.  There was complete hemostasis from the

tumor bed and at this point, the procedure was complete.  The bladder was

drained and the resectoscope was removed.  A 20-Irish 3-way Mills catheter

was then passed and placed to continuous bladder irrigation.  The patient

tolerated the procedure well and was returned to the supine position and

taken to the recovery room awake and in stable condition.  Exam of the

urethra was within normal limits.  Prostate was moderately enlarged and

occlusive in appearance with lateral lobe growth.  The patient tolerated

the procedure well.  He was taken to the recovery room awake and in stable

condition.







__________________________________________

Beka Bhakta MD





DD:  12/17/2018 9:15:29

DT:  12/17/2018 9:18:56

Job # [id number]